# Patient Record
Sex: MALE | Race: WHITE | NOT HISPANIC OR LATINO | URBAN - METROPOLITAN AREA
[De-identification: names, ages, dates, MRNs, and addresses within clinical notes are randomized per-mention and may not be internally consistent; named-entity substitution may affect disease eponyms.]

---

## 2023-02-25 ENCOUNTER — EMERGENCY (EMERGENCY)
Facility: HOSPITAL | Age: 53
LOS: 0 days | Discharge: HOME | End: 2023-02-26
Attending: EMERGENCY MEDICINE
Payer: COMMERCIAL

## 2023-02-25 VITALS
OXYGEN SATURATION: 98 % | RESPIRATION RATE: 18 BRPM | SYSTOLIC BLOOD PRESSURE: 133 MMHG | DIASTOLIC BLOOD PRESSURE: 97 MMHG | HEART RATE: 80 BPM | TEMPERATURE: 98 F | WEIGHT: 240.08 LBS

## 2023-02-25 DIAGNOSIS — D18.09 HEMANGIOMA OF OTHER SITES: ICD-10-CM

## 2023-02-25 DIAGNOSIS — F10.99 ALCOHOL USE, UNSPECIFIED WITH UNSPECIFIED ALCOHOL-INDUCED DISORDER: ICD-10-CM

## 2023-02-25 DIAGNOSIS — S06.9X1A UNSPECIFIED INTRACRANIAL INJURY WITH LOSS OF CONSCIOUSNESS OF 30 MINUTES OR LESS, INITIAL ENCOUNTER: ICD-10-CM

## 2023-02-25 DIAGNOSIS — S43.015A ANTERIOR DISLOCATION OF LEFT HUMERUS, INITIAL ENCOUNTER: ICD-10-CM
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
## 2023-02-25 DIAGNOSIS — R51.9 HEADACHE, UNSPECIFIED: ICD-10-CM

## 2023-02-25 DIAGNOSIS — Z23 ENCOUNTER FOR IMMUNIZATION: ICD-10-CM

## 2023-02-25 DIAGNOSIS — S01.81XA LACERATION WITHOUT FOREIGN BODY OF OTHER PART OF HEAD, INITIAL ENCOUNTER: ICD-10-CM

## 2023-02-25 DIAGNOSIS — M25.512 PAIN IN LEFT SHOULDER: ICD-10-CM

## 2023-02-25 PROCEDURE — 72125 CT NECK SPINE W/O DYE: CPT | Mod: 26,MA

## 2023-02-25 PROCEDURE — 99285 EMERGENCY DEPT VISIT HI MDM: CPT | Mod: 25

## 2023-02-25 PROCEDURE — 70486 CT MAXILLOFACIAL W/O DYE: CPT | Mod: 26,MA

## 2023-02-25 PROCEDURE — 76376 3D RENDER W/INTRP POSTPROCES: CPT

## 2023-02-25 PROCEDURE — 73030 X-RAY EXAM OF SHOULDER: CPT | Mod: LT

## 2023-02-25 PROCEDURE — 12011 RPR F/E/E/N/L/M 2.5 CM/<: CPT

## 2023-02-25 PROCEDURE — 72125 CT NECK SPINE W/O DYE: CPT | Mod: MA

## 2023-02-25 PROCEDURE — 23650 CLTX SHO DSLC W/MNPJ WO ANES: CPT | Mod: LT

## 2023-02-25 PROCEDURE — 70486 CT MAXILLOFACIAL W/O DYE: CPT | Mod: MA

## 2023-02-25 PROCEDURE — 99284 EMERGENCY DEPT VISIT MOD MDM: CPT | Mod: 25

## 2023-02-25 PROCEDURE — 90471 IMMUNIZATION ADMIN: CPT

## 2023-02-25 PROCEDURE — 96374 THER/PROPH/DIAG INJ IV PUSH: CPT | Mod: XU

## 2023-02-25 PROCEDURE — 70450 CT HEAD/BRAIN W/O DYE: CPT | Mod: 26,MA

## 2023-02-25 PROCEDURE — 73060 X-RAY EXAM OF HUMERUS: CPT | Mod: LT

## 2023-02-25 PROCEDURE — 70450 CT HEAD/BRAIN W/O DYE: CPT | Mod: MA

## 2023-02-25 PROCEDURE — 90715 TDAP VACCINE 7 YRS/> IM: CPT

## 2023-02-25 PROCEDURE — 96375 TX/PRO/DX INJ NEW DRUG ADDON: CPT | Mod: XU

## 2023-02-25 PROCEDURE — 76376 3D RENDER W/INTRP POSTPROCES: CPT | Mod: 26

## 2023-02-25 RX ORDER — TETANUS TOXOID, REDUCED DIPHTHERIA TOXOID AND ACELLULAR PERTUSSIS VACCINE, ADSORBED 5; 2.5; 8; 8; 2.5 [IU]/.5ML; [IU]/.5ML; UG/.5ML; UG/.5ML; UG/.5ML
0.5 SUSPENSION INTRAMUSCULAR ONCE
Refills: 0 | Status: COMPLETED | OUTPATIENT
Start: 2023-02-25 | End: 2023-02-25

## 2023-02-25 RX ADMIN — TETANUS TOXOID, REDUCED DIPHTHERIA TOXOID AND ACELLULAR PERTUSSIS VACCINE, ADSORBED 0.5 MILLILITER(S): 5; 2.5; 8; 8; 2.5 SUSPENSION INTRAMUSCULAR at 23:52

## 2023-02-25 NOTE — ED PROVIDER NOTE - PROGRESS NOTE DETAILS
Mucosal Advancement Flap Text: Given the location of the defect, shape of the defect and the proximity to free margins a mucosal advancement flap was deemed most appropriate. Incisions were made with a 15 blade scalpel in the appropriate fashion along the cutaneous vermillion border and the mucosal lip. The remaining actinically damaged mucosal tissue was excised.  The mucosal advancement flap was then elevated to the gingival sulcus with care taken to preserve the neurovascular structures and advanced into the primary defect. Care was taken to ensure that precise realignment of the vermillion border was achieved. JG:  Discussed case with Dr. Gurrola who sent resident for reduction of shoulder.  Shoulder reduced and post reduction xray done and reduction successful. Patient to follow up with ortho with a sling.

## 2023-02-25 NOTE — ED PROVIDER NOTE - NSFOLLOWUPINSTRUCTIONS_ED_ALL_ED_FT
Keep dressing and gauze in place for 24 hours.  May remove keep dry in 1-2 days.  Apply bacitracin or triple antibiotic ointment to face on all deep abrasions twice daily for the next 7 days.    See your doctor for wound check within 1-2 days.  Keep steri strips in place and allow them to fall off on their own.  Do not peel off the steri strips.    Keep sling in place at all times until you see an orthopedic doctor.  Referral given for orthopedics.    OK to take tylenol or ibuprofen as needed for pain.                                                                                                                                                                                                                                                                                                                                                                                                                                                                      [employer]                                                                                             Sterile Tape Wound Care      Some cuts and wounds can be closed using sterile tape, also called skin adhesive strips. You can use skin adhesive strips for shallow (superficial) and simple cuts, wounds, skin tears (lacerations), and some surgical incisions. These strips are used in place of stitches (sutures), or along with sutures, to hold the edges of your wound together and to promote better healing.    Unlike sutures, adhesive strips do not require needles or anesthetic medicine for placement. The strips usually fall off on their own as your wound heals. It is important to take proper care of your wound while it heals.      Supplies needed:    •Soap and water.      •A clean, dry towel.    •If needed:  •Wound cleanser or saline solution.      •Clean gauze.      •A clean bandage (dressing) or another type of wound dressing may be used to cover or place on your wound. The wound may also be left open to air with no dressing. Follow instructions from your health care provider about what dressing supplies to use.      •Cream or ointment to apply to your wound, if told by your health care provider.          How to care for your sterile tape wound    Wound care     •Try to keep the area around your wound clean and dry. Do not allow the adhesive strips to get wet for the first 12 hours.      • Do not use any soaps or ointments on the wound for the first 12 hours.    •Ask your health care provider how to clean your wound. This may include:  •Using mild soap and water, a wound cleanser, or saline solution.      •Using a clean, dry towel or clean gauze to pat the wound dry after cleaning it. Do not rub or scrub your wound.        • Do not scratch, rub, or pick at your wound area.      • Do not take baths, swim, or use a hot tub until your health care provider approves. Ask your health care provider if you may take showers. You may only be allowed to take sponge baths.      •Protect your wound from further injury until it is healed.      •Protect your wound from sun and tanning bed exposure while it is healing, and for several weeks after healing.        Dressing care   Washing hands with soap and water. •If a dressing was put on your wound, follow instructions from your health care provider about how often to change your dressing. Make sure you:  •Wash your hands with soap and water for at least 20 seconds before and after you change your dressing. If soap and water are not available, use hand .      •Change your dressing as told by your health care provider.      •Leave adhesive strips in place. These skin closures may need to stay in place for 2 weeks or longer. If adhesive strip edges start to loosen and curl up, you may trim the loose edges. Do not remove adhesive strips completely unless your health care provider tells you to do that.        •Keep your dressing dry.      •Ask your health care provider when you can leave your wound uncovered.        Checking for infection   Two wounds with sterile tape. One is normal. The other is red with pus and infected.   Check your wound every day for signs of infection. Check for:  •Redness, swelling, or pain.      •Fluid or blood.      •New warmth, a rash, or hardness at the wound site.      •Pus or a bad smell.        Follow these instructions at home:    Medicines     •Take over-the-counter and prescription medicines only as told by your health care provider.      •If you were prescribed an antibiotic medicine, take or apply it as told by your health care provider. Do not stop using the antibiotic even if you start to feel better.      General instructions     • Do not use any products that contain nicotine or tobacco. These products include cigarettes, chewing tobacco, and vaping devices, such as e-cigarettes. If you need help quitting, ask your health care provider.      •Eat a diet that includes protein, vitamin A, and vitamin C to help the wound heal.      •Drink enough fluid to keep your urine pale yellow.      •Keep all follow-up visits. This is important.        Contact a health care provider if:    •Your adhesive strips become soaked with blood or fall off before your wound has healed. The tape will need to be replaced.      •You have a fever or chills.      •You have redness, swelling, or pain around your wound.      •You have fluid or blood coming from your wound.      •You have new warmth around your wound.      •You develop a rash after the strips are applied.      •You have a hardness around your wound.        Get help right away if:  •You have any of these signs of severe infection:  •A red streak that goes away from your wound.      •Pus or a bad smell coming from your wound.        •Your wound opens or gets deeper, longer, or wider.        Summary    •Some cuts and wounds can be closed using sterile tape, or skin adhesive strips, without the need for sutures.      •The strips usually fall off on their own as your wound is healing.      •It is important to take proper care of your wound at home while it heals and to clean it as told by your health care provider.      •To help with healing, eat foods that are rich in protein, vitamin A, and vitamin C.      This information is not intended to replace advice given to you by your health care provider. Make sure you discuss any questions you have with your health care provider.      Document Revised: 04/25/2022 Document Reviewed: 04/25/2022    [employer] Patient Education © 2023 [employer]              Deep Skin Avulsion      A deep skin avulsion is a type of open wound. It often results from a severe injury (trauma) that tears away all layers of the skin or an entire body part. The areas of the body that are most often affected include the face, lips, ears, nose, and fingers.    A deep skin avulsion may make structures below the skin become visible. You may be able to see muscle, bone, nerves, and blood vessels. A deep skin avulsion can also damage important structures beneath the skin. These include bones, tendons, nerves, or blood vessels.      What are the causes?    This condition may be caused by an injury, such as:  •Being crushed.      •Falling against a jagged surface.      •An animal bite.      •A gunshot wound.      •A severe burn.      •Being dragged, such as in a bicycle or motorcycle accident.        What are the signs or symptoms?    Symptoms of this condition include:  •Pain.      •Numbness.      •Swelling.      •Bleeding, which may be heavy.        How is this diagnosed?    This condition may be diagnosed with a medical history and physical exam. You may also have X-rays done.      How is this treated?    Treatment for this condition depends on how large and deep the wound is and where it is located. Treatment usually starts with:  •Stopping the bleeding.      •Washing out the wound with a germ-free (sterile) solution.      After initial treatment, the wound may be closed or left open to heal.  •Wounds that are small and clean may be closed with stitches (sutures).      •Wounds that cannot be closed with sutures may be covered with a piece of skin (graft) or your own skin flap.      •Wounds that are hard to close or that may become infected may be left open. These wounds heal over time.      You may also be treated with medicine, such as:  •Antibiotic medicine.      •Pain medicine.      •Tetanus shot.        Follow these instructions at home:    Medicines     •If you were prescribed an antibiotic medicine, take or apply it as told by your health care provider. Do not stop taking or using the antibiotic even if your condition improves.      •Take over-the-counter and prescription medicines only as told by your health care provider.      •If you were prescribed a pain medicine, take it 30 minutes or more before you do any wound care, or as told by your health care provider.      •Ask your health care provider if the medicine prescribed to you requires you to avoid driving or using machinery.        Wound care    •Follow instructions from your health care provider about how to take care of your wound. Make sure you:   •Wash your hands with soap and water for at least 20 seconds before and after you change your bandage (dressing). If soap and water are not available, use hand .      •Change your dressing as told by your health care provider.       •Leave sutures, skin glue, or adhesive strips in place. These skin closures may need to stay in place for 2 weeks or longer. If adhesive strip edges start to loosen and curl up, you may trim the loose edges. Do not remove adhesive strips completely unless your health care provider tells you to do that.      •Clean the wound each day, or as told by your health care provider:  •Wash the wound with mild soap and water.      •Rinse the wound with water to remove all soap.      •Pat the wound dry with a clean towel. Do not rub it.      •Cover the wound with a clean dressing.        •Keep your dressing clean and dry. Do not take baths, swim, use a hot tub, or do anything that would put your wound under water until your health care provider approves.    •Check your wound every day for signs of infection. Check for:  •More redness, swelling, or pain.      •More fluid or blood.      •Warmth.       •Pus or a bad smell.        • Do not scratch or pick at the wound.      General instructions     •Raise (elevate) the injured area above the level of your heart while you are sitting or lying down.      • Do not use any products that contain nicotine or tobacco, such as cigarettes, e-cigarettes, and chewing tobacco. These may delay wound healing. If you need help quitting, ask your health care provider.      •Eat a healthy diet to help your wound heal. This includes eating foods rich in protein, vitamin A, and vitamin C.      •Keep all follow-up visits. This is important.        Contact a health care provider if:    •You have pain that does not get better with medicine.    •You have any of these signs of infection:  •More redness, swelling, or pain around your wound.      •More fluid or blood coming from your wound.      •A fever.        •You got a tetanus shot and you have swelling, severe pain, redness, or bleeding at the injection site.      •You are nauseous or you vomit.      •You notice something coming out of the wound, such as wood or glass.        Get help right away if:    •You have a red streak going away from your wound.      •A wound that was closed breaks open.      •The wound is bleeding, and the bleeding does not stop with gentle pressure.      •You have trouble breathing.    •The wound is on your hand or foot and:  •You cannot properly move a finger or toe.      •Your fingers or toes look pale or bluish.          Summary    •A deep skin avulsion is a type of injury that can damage important structures beneath the skin.      •A wound may be closed or left open to heal. This depends on the size and location of the wound and whether it is likely to become infected.      •Follow instructions from your health care provider about how to take care of your wound.       •Contact your health care provider if you have increased redness, swelling, or pain around your wound, or if your pain is not controlled with medicine.      This information is not intended to replace advice given to you by your health care provider. Make sure you discuss any questions you have with your health care provider.      Document Revised: 03/24/2021 Document Reviewed: 03/24/2021    [employer] Patient Education © 2023 [employer]       Head Injury    WHAT YOU NEED TO KNOW:    A head injury is most often caused by a blow to the head. This may occur from a fall, bicycle injury, sports injury, being struck in the head, or a motor vehicle accident.     DISCHARGE INSTRUCTIONS:    Call 911 or have someone else call for any of the following:     You cannot be woken.      You have a seizure.      You stop responding to others or you faint.      You have blurry or double vision.      Your speech becomes slurred or confused.      You have arm or leg weakness, loss of feeling, or new problems with coordination.      Your pupils are larger than usual or one pupil is a different size than the other.       You have blood or clear fluid coming out of your ears or nose.    Return to the emergency department if:     You have repeated or forceful vomiting.      You feel confused.      Your headache gets worse or becomes severe.      You or someone caring for you notices that you are harder to wake than usual.    Contact your healthcare provider if:     Your symptoms last longer than 6 weeks after the injury.      You have questions or concerns about your condition or care.    Medicines:     Acetaminophen decreases pain. Acetaminophen is available without a doctor's order. Ask how much to take and how often to take it. Follow directions. Acetaminophen can cause liver damage if not taken correctly.      Take your medicine as directed. Contact your healthcare provider if you think your medicine is not helping or if you have side effects. Tell him or her if you are allergic to any medicine. Keep a list of the medicines, vitamins, and herbs you take. Include the amounts, and when and why you take them. Bring the list or the pill bottles to follow-up visits. Carry your medicine list with you in case of an emergency.    Self-care:     Rest or do quiet activities for 24 to 48 hours. Limit your time watching TV, using the computer, or doing tasks that require a lot of thinking. Slowly return to your normal activities as directed. Do not play sports or do activities that may cause you to get hit in the head. Ask your healthcare provider when you can return to sports.       Apply ice on your head for 15 to 20 minutes every hour or as directed. Use an ice pack, or put crushed ice in a plastic bag. Cover it with a towel before you apply it to your skin. Ice helps prevent tissue damage and decreases swelling and pain.       Have someone stay with you for 24 hours or as directed. This person can monitor you for complications and call 911. When you are awake the person should ask you a few questions to see if you are thinking clearly. An example would be to ask your name or your address.     Prevent another head injury:     Wear a helmet that fits properly. Do this when you play sports, or ride a bike, scooter, or skateboard. Helmets help decrease your risk of a serious head injury. Talk to your healthcare provider about other ways you can protect yourself if you play sports.      Wear your seat belt every time you are in a car. This helps to decrease your risk for a head injury if you are in a car accident.     Follow up with your healthcare provider as directed: Write down your questions so you remember to ask them during your visits.      EnglishSpanish                                                                                                                                                                            Shoulder Dislocation    Three images of the anatomy of the shoulder. One is normal. The other two are dislocated.   Your shoulder joint is made up of 3 bones:  •The upper arm bone (humerus).      •The shoulder blade (scapula).      •The collarbone (clavicle).      A shoulder dislocation happens when your upper arm bone moves out of its normal place in your shoulder joint.      What are the causes?    This condition is often caused by:  •A fall.      •A hard, direct hit to the shoulder.      •A forceful movement of the shoulder.        What increases the risk?    You are more likely to develop this condition if you play sports.      What are the signs or symptoms?  The upper body showing a dislocated shoulder.   •Bad shape (deformity) of the shoulder.      •Very bad pain.      •A shoulder that you cannot move.      •Numbness, weakness, or tingling in your neck or down your arm.      •Bruising or swelling around your shoulder.        How is this treated?    This condition is treated with a procedure called a reduction. This is done to place the upper arm bone back in the joint. There are two types of reduction:  •Closed reduction. The upper arm bone is placed back in the joint without surgery. The doctor uses his or her hands to guide the bone back into place.    •Open reduction. Surgery is done to place the upper arm bone back in the joint. This may be needed if:  •You have a weak shoulder joint or weak tissues that connect bones to each other (ligaments).      •You have had more than one shoulder dislocation.      •The nerves or blood vessels around your shoulder have been damaged.        After the procedure, you will wear a brace or sling to prevent the arm from moving.    After the brace or sling is removed, you will have physical therapy to help improve movement (range of motion) in your shoulder joint.      Follow these instructions at home:    Medicines     •Take over-the-counter and prescription medicines only as told by your doctor.    •Ask your doctor if the medicine prescribed to you:   •Requires you to avoid driving or using machinery.    •Can cause trouble pooping (constipation). You may need to take steps to prevent or treat trouble pooping:  •Drink enough fluid to keep your pee (urine) pale yellow.      •Take over-the-counter or prescription medicines.      •Eat foods that are high in fiber. These include beans, whole grains, and fresh fruits and vegetables.       •Limit foods that are high in fat and sugar. These include fried or sweet foods.          If you have a brace or sling:     •Wear the brace or sling as told by your doctor. Remove it only as told by your doctor.    •Loosen the brace or sling if your fingers:  •Tingle.      •Become numb.      •Turn cold or blue.        •Keep the brace or sling clean.    •If the brace or sling is not waterproof:  •Do not let it get wet.      •Cover it with a watertight covering when you take a bath or shower.          Managing pain, stiffness, and swelling   Bag of ice on a towel on the skin. •If told, put ice on the injured area.  •If you can remove your brace or sling, remove it as told by your doctor.      •Put ice in a plastic bag.      •Place a towel between your skin and the bag.      •Leave the ice on for 20 minutes, 2–3 times per day.        •Move your fingers often.      •Raise (elevate) the injured area above the level of your heart while you are sitting or lying down.      Activity     • Do not lift your arm above shoulder level until your doctor approves.      • Do not lift anything until your doctor says that it is safe.      • Do not push or pull things until your doctor approves.      •Return to your normal activities as told by your doctor. Ask your doctor what activities are safe for you.      •Do range-of-motion exercises only as told by your doctor.      •Exercise your hand by squeezing a soft ball. This keeps your hand and wrist from getting stiff and swollen.      General instructions     • Do not drive while you are wearing a brace or sling on a hand that you use for driving.      • Do not take baths, swim, or use a hot tub until your doctor approves. Ask your doctor if you may take showers. You may only be allowed to take sponge baths.      • Do not use any tobacco products, including cigarettes, chewing tobacco, or e-cigarettes. These can delay healing. If you need help quitting, ask your doctor.      •Keep all follow-up visits as told by your doctor. This is important.        Contact a doctor if:    •Your brace or sling gets damaged.        Get help right away if:    •Your pain gets worse, not better.      •You lose feeling in your arm or hand.      •Your arm or hand turns white and cold.        Summary    •A shoulder dislocation happens when your upper arm bone moves out of its normal place in your shoulder joint.      •It is often caused by a fall, a strong hit to the shoulder, or a forceful movement of the shoulder.      •It causes very bad pain. You may not be able to move your shoulder.      •This condition is treated with either closed or open reduction. You will also be given a brace or sling. You will do exercises to improve movement in your shoulder joint.      •Contact a doctor if your brace or sling gets damaged. Get help right away if your pain gets worse, you lose feeling in your arm or hand, or your arm or hand turns white or cold.      This information is not intended to replace advice given to you by your health care provider. Make sure you discuss any questions you have with your health care provider.      Document Revised: 09/07/2022 Document Reviewed: 09/07/2022    [employer] Patient Education © 2023 [employer]

## 2023-02-25 NOTE — ED PROVIDER NOTE - CARE PROVIDER_API CALL
Junior Gurrola)  Orthopaedic Surgery; Surgery of the Hand  [address] [place] [address]  Phone: [phone]  Fax: [phone]  Follow Up Time:

## 2023-02-25 NOTE — ED PROVIDER NOTE - CONSTITUTIONAL, MLM
normal... Well appearing, awake, alert, oriented to person, place, time/situation; answering all questions; alcohol on breath; HEAD: +hematoma mid forehead with 1.5cm irregular laceration mid forehead; +abrasions on face
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
## 2023-02-25 NOTE — ED PROVIDER NOTE - PATIENT PORTAL LINK FT
You can access the FollowMyHealth Patient Portal offered by [facility] by registering at the following website: [email or web address]. By joining [employer]’s FollowMyHealth portal, you will also be able to view your health information using other applications (apps) compatible with our system.
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
## 2023-02-25 NOTE — ED ADULT NURSE NOTE - NSIMPLEMENTINTERV_GEN_ALL_ED
Implemented All Fall Risk Interventions:  [place] to call system. Call bell, personal items and telephone within reach. Instruct patient to call for assistance. Room bathroom lighting operational. Non-slip footwear when patient is off stretcher. Physically safe environment: no spills, clutter or unnecessary equipment. Stretcher in lowest position, wheels locked, appropriate side rails in place. Provide visual cue, wrist band, yellow gown, etc. Monitor gait and stability. Monitor for mental status changes and reorient to person, place, and time. Review medications for side effects contributing to fall risk. Reinforce activity limits and safety measures with patient and family.
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
## 2023-02-25 NOTE — ED PROVIDER NOTE - MUSCULOSKELETAL, MLM
Spine appears normal without midline spinal tenderness, range of motion is not limited, no muscle or joint tenderness Spine appears normal without midline spinal tenderness, decreased active ROM of left shoulder; pain with adduction and external rotation of left shoulder; +TTP and step off left anterior shoulder

## 2023-02-26 VITALS
SYSTOLIC BLOOD PRESSURE: 132 MMHG | RESPIRATION RATE: 16 BRPM | HEART RATE: 76 BPM | DIASTOLIC BLOOD PRESSURE: 80 MMHG | OXYGEN SATURATION: 99 %

## 2023-02-26 PROCEDURE — 73030 X-RAY EXAM OF SHOULDER: CPT | Mod: 26,LT,76

## 2023-02-26 PROCEDURE — 73060 X-RAY EXAM OF HUMERUS: CPT | Mod: 26,LT

## 2023-02-26 RX ORDER — DIAZEPAM 5 MG
5 TABLET ORAL ONCE
Refills: 0 | Status: DISCONTINUED | OUTPATIENT
Start: 2023-02-26 | End: 2023-02-26

## 2023-02-26 RX ORDER — KETOROLAC TROMETHAMINE 30 MG/ML
30 SYRINGE (ML) INJECTION ONCE
Refills: 0 | Status: DISCONTINUED | OUTPATIENT
Start: 2023-02-26 | End: 2023-02-26

## 2023-02-26 RX ADMIN — Medication 5 MILLIGRAM(S): at 02:00

## 2023-02-26 RX ADMIN — Medication 30 MILLIGRAM(S): at 02:00
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
## 2023-02-26 NOTE — ED PROCEDURE NOTE - PROCEDURE ADDITIONAL DETAILS
Surgifoam applied to left nasal fold of face due to skin avulsion/abrasion.    Surgifoam and gauze applied to forehead.

## 2023-04-12 NOTE — ED ADULT TRIAGE NOTE - SOURCE OF INFORMATION
Patient called back to schedule:    Future Appointments   Date Time Provider iNlton Varela   5/3/2023 10:40 AM Omar Mccullough MD [id number]   6/10/2023 11:00 AM Zack Newell MD [facility] [place] Patient
